# Patient Record
Sex: FEMALE | Race: WHITE | NOT HISPANIC OR LATINO | Employment: OTHER | URBAN - METROPOLITAN AREA
[De-identification: names, ages, dates, MRNs, and addresses within clinical notes are randomized per-mention and may not be internally consistent; named-entity substitution may affect disease eponyms.]

---

## 2017-11-29 ENCOUNTER — CLINICAL SUPPORT (OUTPATIENT)
Dept: GENETICS | Facility: HOSPITAL | Age: 62
End: 2017-11-29

## 2017-11-29 ENCOUNTER — LAB (OUTPATIENT)
Dept: LAB | Facility: HOSPITAL | Age: 62
End: 2017-11-29

## 2017-11-29 ENCOUNTER — DOCUMENTATION (OUTPATIENT)
Dept: OTHER | Facility: HOSPITAL | Age: 62
End: 2017-11-29

## 2017-11-29 DIAGNOSIS — Z80.0 FAMILY HISTORY OF COLON CANCER: ICD-10-CM

## 2017-11-29 DIAGNOSIS — Z85.42 HISTORY OF ENDOMETRIAL CANCER: ICD-10-CM

## 2017-11-29 DIAGNOSIS — Z13.79 GENETIC TESTING: Primary | ICD-10-CM

## 2017-11-29 DIAGNOSIS — Z80.3 FAMILY HISTORY OF BREAST CANCER: ICD-10-CM

## 2017-11-29 DIAGNOSIS — Z85.3 HISTORY OF BREAST CANCER: Primary | ICD-10-CM

## 2017-11-29 NOTE — PROGRESS NOTES
Patient called and left a voicemail on the Genetic's line. Patient said that she never heard if her insurance was approved for the Genetic visit today. I talked with Carie Baxter and she gave me a CPT code to give patient for her to call her insurance company. Patient said that she would call her insurance. Patient is on her way here to her 10:30 Genetic's appointment. AG

## 2017-11-29 NOTE — PROGRESS NOTES
Liz Rivera, a 62-year-old female, was referred for genetic counseling due to a personal history of breast cancer. Ms. Rivera diagnosed with breast cancer at 48, and diagnosed with uterine cancer at 56. She was also diagnosed with squamous cell carcinoma at 57. Following her breast cancer diagnosis, Ms. Rivera underwent a lumpectomy, followed by chemotherapy and radiation. Ms. Rivera had a total hysterectomy following her uterine cancer diagnosis. Her current cancer screening includes annual clinical breast exam and annual mammogram. She has colonoscopies every five years. She had an endoscopy performed approximately two years ago, and a few polyps were identified in her stomach lining. She was interested in discussing her risk for a hereditary cancer syndrome.  Ms. Rivera was interested in pursuing a multi-gene panel, therefore the CancerNext panel was ordered through [employer] [facility] which analyzes 34 genes associated with an increased cancer risk. The genes on this panel include APC, MARIVEL, BARD1, BMPR1A, BRCA1, BRCA2, BRIP1, CDH1, CDK4, CDKN2A, CHEK2, DICER1, EPCAM, GREM1, HOXB13, MLH1, MRE11A, MSH2, MSH6, MUTYH, NBN, NF1, PALB2, PMS2, POLD1, POLE, PTEN, RAD50, RAD51C, RAD51D, SMAD4, SMARCA4, STK11, and TP53. Results are expected in 2-3 weeks.     PERTINENT FAMILY HISTORY: (See attached pedigree)   Mother:  Colon cancer, 71  Mat. Aunt:  Breast cancer, 60  Mat. Grandmother: Breast cancer, 52  Father:   Bladder cancer, 90  Pat. Aunt:  Breast cancer, 50  Pat. Grandmother: Breast cancer, 68  Pat. Uncle:  Prostate cancer, 89  Pat. First Cousin: Lung cancer, 60    We do not have medical records regarding any of these diagnoses.      RISK ASSESSMENT:  Ms. Rivera’s personal history of breast and uterine cancer, as well as her family history of breast cancer raises the question of a hereditary cancer syndrome. We discussed BRCA1/2 testing as well as the option of pursuing a panel that would test for other genes known to  impact cancer risk in addition to BRCA1/2.   Ms. Rivera clearly meets NCCN guidelines criteria for BRCA1/2 testing based on her age of diagnosis, in addition to family history. These risk assessments are based on the family history information provided at the time of the appointment.  The assessments could change in the future should new information be obtained.    GENETIC COUNSELING (30 minutes):  We reviewed the family history information in detail. Cases of cancer follow three general patterns: sporadic, familial, and hereditary.  While most cancer is sporadic, some cases appear to occur in family clusters.  These cases are said to be familial and account for 10-20% of cancer cases.  Familial cases may be due to a combination of shared genes and environmental factors among family members.  In even fewer families, the cancer is said to be inherited, and the genes responsible for the cancer are known.      Family histories typical of hereditary cancer syndromes usually include multiple first- and second-degree relatives diagnosed with cancer types that define a syndrome.  These cases tend to be diagnosed at younger-than-expected ages and can be bilateral or multifocal.  The cancer in these families follows an autosomal dominant inheritance pattern, which indicates the likely presence of a mutation in a cancer susceptibility gene.  Children and siblings of an individual believed to carry this mutation have a 50% chance of inheriting that mutation, thereby inheriting the increased risk to develop cancer.  These mutations can be passed down from the maternal or the paternal lineage.    Hereditary breast cancer accounts for 5-10% of all cases of breast cancer.  A significant proportion of hereditary breast and ovarian cancer can be attributed to mutations in the BRCA1 and BRCA2 genes.  Mutations in these genes confer an increased risk for breast cancer, ovarian cancer, male breast cancer, prostate cancer, and  pancreatic cancer.  Women with a BRCA1 or BRCA2 mutation have up to an 87% lifetime risk of breast cancer and up to a 44% risk of ovarian cancer. Women with a BRCA1 or BRCA2 mutation who have already been diagnosed with breast cancer have a 40-60% lifetime risk of a second breast cancer.    Cowden syndrome is a rare hereditary condition in which individuals are at an increased risk of developing breast (up to 85%), thyroid (up to 38%), renal (up to 34%), colon up to 16%), and endometrial (up to 28%) cancer. Other features include skin and mucosal lesions, fibrocystic breast disease, uterine abnormalities and tumors of the cerebellum or kidney. There is variability within the syndrome; therefore, the level of involvement may vary among affected individuals. Cowden syndrome is an inherited condition caused by mutations in the PTEN gene. Cowden syndrome is inherited in a dominant fashion; therefore, individuals who are diagnosed with the condition have a 50% chance of passing the gene mutation to his/her offspring.      There are other genes that are known to be associated with an increased risk for cancer.  Some of these genes have well defined cancer risks and established management guidelines.  Other genes that can be tested for have been more recently described, and there may be less data regarding the risks and therefore may not have established management guidelines.  Based on Ms. Rivera’s desire to get as much information as possible regarding her personal risks and potential risks for her family, she opted to pursue testing through a panel that would look at several other genes known to increase the risk for breast cancer and other cancers.    GENETIC TESTING:  The risks, benefits and limitations of genetic testing and implications for clinical management following testing were reviewed.  DNA test results can influence decisions regarding screening, prevention and surgical management.  Genetic testing can have  significant psychological implications for both individuals and families.  Also discussed was the possibility of employment and insurance discrimination based on genetic test results and the laws in place to prevent this.    We discussed panel testing, which would involve testing for BRCA1/2 as well as 32 additional genes that are associated with increased cancer risk. The benefits and limitations of genetic testing were discussed and Ms. Rivera decided to pursue testing via the panel. The implications of a positive or negative test result were discussed. We discussed the possibility that, in some cases, genetic test results may be informative or may be ambiguous due to the identification of a genetic variant. These variants may or may not be associated with an increased cancer risk.  With multigene panel testing, it is not uncommon for a variant of uncertain significance (VUS) to be identified.  If a VUS is identified, testing family members is typically not recommended and screening recommendations are made based on the family history.  The laboratories that perform genetic testing work to reclassify the VUS and send out an amended report if and when a VUS is reclassified.  The majority of variant findings are ultimately reclassified to a negative result.  Given her personal history, a negative test result would not eliminate all breast cancer risk to her relatives, although the risk would not be as high as it would with positive genetic testing.      PLAN: Genetic testing via the CancerNext panel through [facility] was ordered and results are expected in 2-3 weeks.  Ms. Rivera is welcome to contact us in the meantime at [phone] with any questions she may have.      Tammi Matute MS  Genetic Counselor

## 2017-12-22 ENCOUNTER — DOCUMENTATION (OUTPATIENT)
Dept: GENETICS | Facility: HOSPITAL | Age: 62
End: 2017-12-22

## 2017-12-22 NOTE — PROGRESS NOTES
Liz Rivera, a 62-year-old female, was referred for genetic counseling due to a personal history of breast cancer. Ms. Rivera was diagnosed with breast cancer at 48, and diagnosed with uterine cancer at 56. She was also diagnosed with squamous cell carcinoma at 57. Following her breast cancer diagnosis, Ms. Rivera underwent a lumpectomy, followed by chemotherapy and radiation. Ms. Rivera had a total hysterectomy following her uterine cancer diagnosis. Her current cancer screening includes annual clinical breast exam and annual mammogram. She has colonoscopies every five years. She had an endoscopy performed in 2016, and a fundic gland polyp was identified. Ms. Rivera was interested in discussing her risk for a hereditary cancer syndrome.  She was interested in pursuing a multi-gene panel, therefore the CancerNext panel was ordered through [facility] which analyzes 34 genes associated with an increased cancer risk. The genes on this panel include APC, MARIVEL, BARD1, BMPR1A, BRCA1, BRCA2, BRIP1, CDH1, CDK4, CDKN2A, CHEK2, DICER1, EPCAM, GREM1, HOXB13, MLH1, MRE11A, MSH2, MSH6, MUTYH, NBN, NF1, PALB2, PMS2, POLD1, POLE, PTEN, RAD50, RAD51C, RAD51D, SMAD4, SMARCA4, STK11, and TP53. Genetic testing was negative by sequencing and rearrangement testing for deleterious mutations in the 34 genes included on the CancerNext panel (see attached results). These normal results were discussed with Ms. Rivera by telephone on 12/22/17.    PERTINENT FAMILY HISTORY: (See attached pedigree)   Mother:  Colon cancer, 71  Mat. Aunt:  Breast cancer, 60  Mat. Grandmother: Breast cancer, 52  Father:   Bladder cancer, 90  Pat. Aunt:  Breast cancer, 50  Pat. Grandmother: Breast cancer, 68  Pat. Uncle:  Prostate cancer, 89  Pat. First Cousin: Lung cancer, 60    We do not have medical records regarding any of these diagnoses.      RISK ASSESSMENT:  Ms. Rivera’s personal history of breast and uterine cancer, as well as her family history of  breast cancer raises the question of a hereditary cancer syndrome. We discussed BRCA1/2 testing as well as the option of pursuing a panel that would test for other genes known to impact cancer risk in addition to BRCA1/2.   Ms. Rivera clearly meets NCCN guidelines criteria for BRCA1/2 testing based on her age of diagnosis, in addition to family history. These risk assessments are based on the family history information provided at the time of the appointment.  The assessments could change in the future should new information be obtained.    GENETIC COUNSELING (30 minutes):  We reviewed the family history information in detail. Cases of cancer follow three general patterns: sporadic, familial, and hereditary.  While most cancer is sporadic, some cases appear to occur in family clusters.  These cases are said to be familial and account for 10-20% of cancer cases.  Familial cases may be due to a combination of shared genes and environmental factors among family members.  In even fewer families, the cancer is said to be inherited, and the genes responsible for the cancer are known.      Family histories typical of hereditary cancer syndromes usually include multiple first- and second-degree relatives diagnosed with cancer types that define a syndrome.  These cases tend to be diagnosed at younger-than-expected ages and can be bilateral or multifocal.  The cancer in these families follows an autosomal dominant inheritance pattern, which indicates the likely presence of a mutation in a cancer susceptibility gene.  Children and siblings of an individual believed to carry this mutation have a 50% chance of inheriting that mutation, thereby inheriting the increased risk to develop cancer.  These mutations can be passed down from the maternal or the paternal lineage.    Hereditary breast cancer accounts for 5-10% of all cases of breast cancer.  A significant proportion of hereditary breast and ovarian cancer can be attributed  to mutations in the BRCA1 and BRCA2 genes.  Mutations in these genes confer an increased risk for breast cancer, ovarian cancer, male breast cancer, prostate cancer, and pancreatic cancer.  Women with a BRCA1 or BRCA2 mutation have up to an 87% lifetime risk of breast cancer and up to a 44% risk of ovarian cancer. Women with a BRCA1 or BRCA2 mutation who have already been diagnosed with breast cancer have a 40-60% lifetime risk of a second breast cancer.    Given Ms. Rivera’s personal history of breast and uterine cancer, we discussed Cowden syndrome. Cowden syndrome is a rare hereditary condition in which individuals are at an increased risk of developing breast (up to 85%), thyroid (up to 38%), renal (up to 34%), colon (up to 16%), and endometrial (up to 28%) cancer. Other features include skin and mucosal lesions, fibrocystic breast disease, uterine abnormalities and tumors of the cerebellum or kidney. There is variability within the syndrome; therefore, the level of involvement may vary among affected individuals. Cowden syndrome is an inherited condition caused by mutations in the PTEN gene. Cowden syndrome is inherited in a dominant fashion; therefore, individuals who are diagnosed with the condition have a 50% chance of passing the gene mutation to his/her offspring.      There are other genes that are known to be associated with an increased risk for cancer.  Some of these genes have well defined cancer risks and established management guidelines.  Other genes that can be tested for have been more recently described, and there may be less data regarding the risks and therefore may not have established management guidelines.  Based on Ms. Rivera’s desire to get as much information as possible regarding her personal risks and potential risks for her family, she opted to pursue testing through a panel that would look at several other genes known to increase the risk for breast cancer and other  cancers.    GENETIC TESTING:  The risks, benefits and limitations of genetic testing and implications for clinical management following testing were reviewed.  DNA test results can influence decisions regarding screening, prevention and surgical management.  Genetic testing can have significant psychological implications for both individuals and families.  Also discussed was the possibility of employment and insurance discrimination based on genetic test results and the laws in place to prevent this.    We discussed panel testing, which would involve testing for BRCA1/2 as well as 32 additional genes that are associated with increased cancer risk. The benefits and limitations of genetic testing were discussed and Ms. Rivera decided to pursue testing via the panel. The implications of a positive or negative test result were discussed. We discussed the possibility that, in some cases, genetic test results may be informative or may be ambiguous due to the identification of a genetic variant. These variants may or may not be associated with an increased cancer risk.  With multigene panel testing, it is not uncommon for a variant of uncertain significance (VUS) to be identified.  If a VUS is identified, testing family members is typically not recommended and screening recommendations are made based on the family history.  The laboratories that perform genetic testing work to reclassify the VUS and send out an amended report if and when a VUS is reclassified.  The majority of variant findings are ultimately reclassified to a negative result.  Given her personal history, a negative test result would not eliminate all breast cancer risk to her relatives, although the risk would not be as high as it would with positive genetic testing.      TEST RESULTS: Genetic testing was negative for known deleterious mutations by sequencing and rearrangement testing for the genes on the CancerNext panel. This negative result lowers, but  does not eliminate, the risk of a hereditary cancer syndrome. At this time, we are unable to determine why Ms. Rivera was diagnosed with cancer. This assessment is based on the information provided at the time of the consultation.    CLINICAL MANAGEMENT GUIDELINES: Per NCCN guidelines, individuals with a first-degree relative diagnosed with colon cancer at any age should begin screening colonoscopy at age 40 or ten years before earliest diagnosis of colon cancer, and repeat every 5-10 years or more frequently based on clinical findings.     Despite the negative genetic test results, Ms. Rivera’s female relatives may have a somewhat increased lifetime risk for breast cancer based on family history.  Given the possible increased risk, options available to individuals with a high lifetime risk for breast cancer were briefly discussed.     Surveillance for individuals with a high lifetime risk of breast cancer (high risk is typically defined as >20%, in comparison to the average risk of 12%), based on NCCN guidelines, would consist of semi-annual clinical breast exams and monthly self-breast exams starting by age 18 and annual mammography starting 10 years younger than the earliest diagnosis in the family.  According to an American Cancer Society expert panel and NCCN guidelines, annual breast MRI should be offered to women whose lifetime risk of breast cancer is 20-25 percent or more.  Relatives may have a personalized risk assessment performed to quantify their breast cancer risk utilizing risk models such as the Tyrer-Cuzick model.      PLAN:  Genetic counseling remains available to Ms. Rivera and her family in the future, and she is welcome to contact us at [phone] with any questions she may have, or updates to the family history.      Tammi Matute MS  Genetic Counselor     Cc: MD Sukhjinder Foley MD

## 2020-12-30 ENCOUNTER — TREATMENT (OUTPATIENT)
Dept: PHYSICAL THERAPY | Facility: CLINIC | Age: 65
End: 2020-12-30

## 2020-12-30 DIAGNOSIS — M65.4 DE QUERVAIN'S DISEASE (TENOSYNOVITIS): Primary | ICD-10-CM

## 2020-12-30 DIAGNOSIS — M25.531 RIGHT WRIST PAIN: ICD-10-CM

## 2020-12-30 PROCEDURE — L3906 WHO W/O JOINTS CF: HCPCS | Performed by: PHYSICAL THERAPIST

## 2020-12-30 NOTE — PROGRESS NOTES
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 Has received at least 8 injection between both hands, pain returns in 6-12 months after injections.. Has never been immobilized after any injection.  Medical/Vocational History/ Medications: PMH See MD. Retired    Pain: APL/EPB tendon at radial styloid process    Edema: None  Sensibility: WNL   Wound Status:N/A  ROM/ Strength: Finkelstein's test +    Splinting:  · Patient was measure and fit with a custom fabricated volar forearm based wrist/hand/thumb immobilization splint with IP joint free.   · Patient was instructed in wearing schedule, precautions and care of the splint during this visit.   · Patient was instructed in proper donning/doffing of splint.   Assessment:  · Patient was fitted and appropriate splint was fabricated this date.  · Patient reported that splint was comfortable and had no complications with the fit of the splint.  · Patient was instructed and patient verbalized understanding of precautions, wear and care of the splint.   · Patient demonstrated independent donning/doffing of splint during treatment today.  Goals:  · Patient was fitted properly with appropriate splint for diagnosis  · Patient was educated on precautions, wear schedule and care of splint  · Patient demonstrated independence with donning/doffing of the splint.  · Splint was provided to Protect Healing Structures, Restrict Mobility, Improve joint alignment.  Plan:  · No additional treatment is required for this patient at this time. The patient is therefore discharged from therapy.  · Patient advised to contact therapist with any additional questions or concerns regarding the fit and function of the splint.  · Patient will be seen for splint issues as  needed   · Wear Instructions: Off for hygiene       PT SIGNATURE: Zach Polo PT, CHT  DATE TREATMENT INITIATED: 12/30/2020

## 2020-12-31 ENCOUNTER — TRANSCRIBE ORDERS (OUTPATIENT)
Dept: PHYSICAL THERAPY | Facility: CLINIC | Age: 65
End: 2020-12-31

## 2020-12-31 DIAGNOSIS — M65.4 DE QUERVAIN'S DISEASE (TENOSYNOVITIS): Primary | ICD-10-CM
